# Patient Record
Sex: MALE | Race: OTHER | HISPANIC OR LATINO | URBAN - METROPOLITAN AREA
[De-identification: names, ages, dates, MRNs, and addresses within clinical notes are randomized per-mention and may not be internally consistent; named-entity substitution may affect disease eponyms.]

---

## 2020-11-05 ENCOUNTER — EMERGENCY (EMERGENCY)
Facility: HOSPITAL | Age: 25
LOS: 1 days | Discharge: HOME | End: 2020-11-05
Attending: EMERGENCY MEDICINE
Payer: COMMERCIAL

## 2020-11-05 VITALS
WEIGHT: 197.09 LBS | HEART RATE: 77 BPM | HEIGHT: 71 IN | RESPIRATION RATE: 18 BRPM | OXYGEN SATURATION: 98 % | SYSTOLIC BLOOD PRESSURE: 142 MMHG | DIASTOLIC BLOOD PRESSURE: 85 MMHG | TEMPERATURE: 98 F

## 2020-11-05 PROCEDURE — 99284 EMERGENCY DEPT VISIT MOD MDM: CPT

## 2020-11-05 PROCEDURE — 99283 EMERGENCY DEPT VISIT LOW MDM: CPT

## 2020-11-05 RX ORDER — LIDOCAINE 4 G/100G
1 CREAM TOPICAL
Qty: 5 | Refills: 0
Start: 2020-11-05 | End: 2020-11-09

## 2020-11-05 RX ORDER — METHOCARBAMOL 500 MG/1
1 TABLET, FILM COATED ORAL
Qty: 12 | Refills: 0
Start: 2020-11-05 | End: 2020-11-08

## 2020-11-05 RX ORDER — IBUPROFEN 200 MG
1 TABLET ORAL
Qty: 20 | Refills: 0
Start: 2020-11-05 | End: 2020-11-09

## 2020-11-05 NOTE — ED PROVIDER NOTE - NSFOLLOWUPCLINICS_GEN_ALL_ED_FT
[facility]  Internal Medicine  [address] [place] [address]  Phone: [phone]  Fax: [phone]  Follow Up Time:

## 2020-11-05 NOTE — ED ADULT NURSE NOTE - NS ED PATIENT SAFETY CONCERN
normal appearance , without tenderness upon palpation , no deformities , trachea midline , Thyroid normal size , no thyroid nodules , no masses , no JVD , thyroid nontender No

## 2020-11-05 NOTE — ED ADULT TRIAGE NOTE - CHIEF COMPLAINT QUOTE
c/o lower back pain for one month ago, getting worse, denied injury or trauma, able to walk without difficulty.

## 2020-11-05 NOTE — ED PROVIDER NOTE - PHYSICAL EXAMINATION
Back is symmetrical, scapulae are symmetric. Neck has full range of motion. L sided L5 paraspinal tenderness, muscle tenseness deformity or step-offs. All limbs equally strong 5+ bilaterally. Strong ankle dorsiflexion and plantar flexion against resistance bilaterally. Strong flexion/extension of big toe bilaterally. Pt is able to walk in straight line with steady gait. No recent weight loss or night sweats. No saddle anesthesia, no bowel/bladder incontinence or retention, no lower extremity weakness.

## 2020-11-05 NOTE — ED PROVIDER NOTE - NSFOLLOWUPINSTRUCTIONS_ED_ALL_ED_FT
Follow up with the primary care doctor within 1 week.  If you experience any new or worsening symptoms or if you are concerned you can always come back to the emergency for a re-evaluation.  If there were any prescriptions given to you during the visit today take them as prescribed. If you have any questions you can ask the pharmacist.

## 2020-11-05 NOTE — ED PROVIDER NOTE - CLINICAL SUMMARY MEDICAL DECISION MAKING FREE TEXT BOX
25 year-old male, presents with L lower back pain x 1 month. Well-appearing, ambulatory, no focal neuro deficits exam. Low suspicion of infectious process, fracture, radiculopathy or cord compression. Will dc with Rx for pain relief and PMD follow up within 1 week.

## 2020-11-05 NOTE — ED PROVIDER NOTE - PATIENT PORTAL LINK FT
You can access the FollowMyHealth Patient Portal offered by [facility] by registering at the following website: [email or web address]. By joining [facility]’s FollowMyHealth portal, you will also be able to view your health information using other applications (apps) compatible with our system.

## 2020-11-05 NOTE — ED PROVIDER NOTE - OBJECTIVE STATEMENT
25 y.o male with no PMHx and no PSHx presents to the ED c.o back pain x1 month, Patient endorses gradual onset of localized left lower back pain , continuous. Patient states it is worse when he wakes up in the morning. Patient denies any alleviation with ibuprofen or tylenol. Patient denies radiation to lower extremities, numbness, tingling, focal weakness, sphincter dysfunction, rash, fever, chills, injury or any other acute complaints. NKDA

## 2022-09-20 NOTE — ED PROVIDER NOTE - NS_EDPROVIDERDISPOUSERTYPE_ED_A_ED
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    Medication verified, no changes  Refills needed today: No    Health Maintenance Due   Topic Date Due   • Hepatitis B Vaccine (1 of 3 - 3-dose series) Never done   • Medicare Advantage- Medicare Wellness Visit  Never done   • COVID-19 Vaccine (5 - Booster for Moderna series) 07/31/2022   • Influenza Vaccine (1) 09/01/2022   • Depression Screening  01/04/2023     Patient is due for topics as listed above.   Scribe Attestation (For Scribes USE Only)... Scribe Attestation (For Scribes USE Only).../Attending Attestation (For Attendings USE Only)...

## 2022-10-05 NOTE — ED ADULT NURSE NOTE - NS ED NOTE  TALK SOMEONE YN
----- Message from Raj Terry MD sent at 10/2/2022  7:28 PM CDT -----  Yes  ----- Message -----  From: Myla Finn RN  Sent: 9/27/2022  12:17 PM CDT  To: Raj Terry MD, [facility]    Dr. Terry-  Is it ok to prescribe metoprolol for CTA for this patient?    Bette  ----- Message -----  From: Jaspreet Li RN  Sent: 9/27/2022  11:52 AM CDT  To: Raj Terry Txp Nurse Msg Pool    Hello,     We have scheduled this patient for a CTA on 10/26/22. The patient will need prescription metoprolol ordered for the morning of the scan. Sometimes providers also include a dose for the night before as well. If your office could order the metoprolol and have the patient pick it up at their preferred pharmacy, that would be great. The goal behind prepping with metoprolol for this scan is to obtain a HR under 65.    Per our Coronary CTA protocols, we will be ordering and administering two nitro patches (each 0.4mg/24hr) and metoprolol PRN (IV & PO) to be given during the test. The nitro patches stay on until the scan is over, or unless the patient is symptomatic from blood pressure changes. PRN metoprolol will only be given if the HR>65, and vitals are safe. These medication will be entered by our RN under the provider who initially ordered the scan.      We will be calling the patient to give him/her additional instructions.    Thank you,  BERNADETTE Haynes RN         
1 time rx for metoprolol sent to [facility] per CTA premed protocol. Patients resting HR greater than 75 BPM.   Labs reviewed after starting Entresto. Stable. Patient to continue with current medications.     Message left for patient to return call.   Component      Latest Ref Rng & Units 9/23/2022 10/1/2022   Sodium      135 - 145 mmol/L 136 139   Potassium      3.4 - 5.1 mmol/L 4.2 4.0   Chloride      97 - 110 mmol/L 100 106   CO2      21 - 32 mmol/L 29 24   ANION GAP      7 - 19 mmol/L 11 13   Glucose      70 - 99 mg/dL 131 (H) 99   BUN      6 - 20 mg/dL 14 12   Creatinine      0.67 - 1.17 mg/dL 1.20 (H) 0.74   Glomerular Filtration Rate      >=60 68 >90   BUN/CREATININE RATIO      7 - 25 12 16   CALCIUM      8.4 - 10.2 mg/dL 9.0 9.2   NT proBNP      <=125 pg/mL 10 14     
Noted.   Will await CT-coronary (though Calcium score and my comments noted on prior Telephone note).  Will re-discuss at upcoming appt.   Willl ask [place]/Cardiology nurse to add \"lipid medication discussion\" to appointment notes. thanks  
Patient called back to discuss metoprolol dosing prior to CTA.  Instructions reviewed He will  additional pills at his pharmacy.      Reviewed lipid panel with patient. He will review in more detail with Dr Buckley at appointment in 1 month.     Holter monitor reviewed - obtained for patient complaint of palpitations.     CONCLUSIONS:   1. This was a 4-day Holter monitor performed for cardiomyopathy, with underlying sinus rhythm and average heart rate 89 bpm.   2. Two patient-reported symptomatic transmissions, demonstrating sinus tachycardia, heart rate 99 bpm and 102 bpm, respectively.   3. No significant ventricular or supraventricular ectopy or arrhythmia was identified.   4. No pauses and no atrial fibrillation were detected.   
No